# Patient Record
Sex: MALE | Race: WHITE | NOT HISPANIC OR LATINO | URBAN - NONMETROPOLITAN AREA
[De-identification: names, ages, dates, MRNs, and addresses within clinical notes are randomized per-mention and may not be internally consistent; named-entity substitution may affect disease eponyms.]

---

## 2020-12-17 ENCOUNTER — OFFICE VISIT (OUTPATIENT)
Dept: URBAN - NONMETROPOLITAN AREA CLINIC 2 | Facility: CLINIC | Age: 60
End: 2020-12-17
Payer: COMMERCIAL

## 2020-12-17 ENCOUNTER — DASHBOARD ENCOUNTERS (OUTPATIENT)
Age: 60
End: 2020-12-17

## 2020-12-17 DIAGNOSIS — B19.20 HCV (HEPATITIS C VIRUS) S/P SVR: ICD-10-CM

## 2020-12-17 DIAGNOSIS — K63.5 COLON POLYPS: ICD-10-CM

## 2020-12-17 DIAGNOSIS — R10.9 ABDOMINAL PAIN: ICD-10-CM

## 2020-12-17 DIAGNOSIS — K20.90 ESOPHAGITIS: ICD-10-CM

## 2020-12-17 PROCEDURE — G8482 FLU IMMUNIZE ORDER/ADMIN: HCPCS | Performed by: NURSE PRACTITIONER

## 2020-12-17 PROCEDURE — 99214 OFFICE O/P EST MOD 30 MIN: CPT | Performed by: NURSE PRACTITIONER

## 2020-12-17 PROCEDURE — G9903 PT SCRN TBCO ID AS NON USER: HCPCS | Performed by: NURSE PRACTITIONER

## 2020-12-17 PROCEDURE — 3017F COLORECTAL CA SCREEN DOC REV: CPT | Performed by: NURSE PRACTITIONER

## 2020-12-17 PROCEDURE — G8420 CALC BMI NORM PARAMETERS: HCPCS | Performed by: NURSE PRACTITIONER

## 2020-12-17 PROCEDURE — G8427 DOCREV CUR MEDS BY ELIG CLIN: HCPCS | Performed by: NURSE PRACTITIONER

## 2020-12-17 RX ORDER — SUCRALFATE 1 G/1
1 TABLET ON AN EMPTY STOMACH TABLET ORAL THREE TIMES A DAY
Qty: 90 TABLET | Refills: 3 | OUTPATIENT
Start: 2020-12-17 | End: 2021-04-16

## 2020-12-17 RX ORDER — OMEPRAZOLE 40 MG/1
1 CAPSULE 30 MINUTES BEFORE MORNING MEAL CAPSULE, DELAYED RELEASE ORAL ONCE A DAY
Qty: 90 CAPSULE | Refills: 3 | OUTPATIENT
Start: 2020-12-17

## 2020-12-17 RX ORDER — DICYCLOMINE HYDROCHLORIDE 20 MG/1
TAKE 1 TABLET (20 MG) BY ORAL ROUTE 4 TIMES PER DAY FOR 30 DAYS TABLET ORAL
Qty: 120 | Refills: 11 | Status: ACTIVE | COMMUNITY
Start: 2020-01-29 | End: 2021-01-23

## 2020-12-17 RX ORDER — METRONIDAZOLE 500 MG/1
TABLET ORAL
Qty: 0 | Refills: 0 | Status: ACTIVE | COMMUNITY
Start: 1900-01-01

## 2020-12-17 RX ORDER — OXYCODONE 15 MG/1
TAKE 1 TABLET (15 MG) BY ORAL ROUTE EVERY 6 HOURS TABLET ORAL
Qty: 0 | Refills: 0 | Status: ACTIVE | COMMUNITY
Start: 1900-01-01

## 2020-12-17 RX ORDER — DICYCLOMINE HYDROCHLORIDE 10 MG/1
1 CAPSULES CAPSULE ORAL THREE TIMES A DAY
Qty: 90 CAPSULE | Refills: 3 | OUTPATIENT
Start: 2020-12-17 | End: 2021-04-16

## 2020-12-17 NOTE — HPI-TODAY'S VISIT:
Arun presents with abdominal pain.  He was last seen in January of this year with some quadrant and flank pain.  A CT was obtained that was normal.  His last EGD was 3/19 with class D esophagitis and a colonoscopy with 1 TA polyp and sigmoid diverticulosis at that time.  He also carries a history of hep C.  HCV undetectable at his last office visit.   He states he continues to have this pain that is now in his R flank. It was in the L. there is a notable buldge, but I dont this is a hernia. He was previously tx with cipro/flagyl with no improvement and we did a CT earlier in the year for this complaint. He is only on pepcid for GERD, which is not covering his reflux. bowels are regular. no additional complaints. SB

## 2022-08-23 ENCOUNTER — TELEPHONE ENCOUNTER (OUTPATIENT)
Dept: URBAN - NONMETROPOLITAN AREA CLINIC 2 | Facility: CLINIC | Age: 62
End: 2022-08-23

## 2022-12-06 ENCOUNTER — OFFICE VISIT (OUTPATIENT)
Dept: URBAN - NONMETROPOLITAN AREA CLINIC 2 | Facility: CLINIC | Age: 62
End: 2022-12-06

## 2024-06-12 ENCOUNTER — LAB OUTSIDE AN ENCOUNTER (OUTPATIENT)
Dept: URBAN - NONMETROPOLITAN AREA CLINIC 2 | Facility: CLINIC | Age: 64
End: 2024-06-12

## 2024-06-12 ENCOUNTER — OFFICE VISIT (OUTPATIENT)
Dept: URBAN - NONMETROPOLITAN AREA CLINIC 2 | Facility: CLINIC | Age: 64
End: 2024-06-12
Payer: COMMERCIAL

## 2024-06-12 VITALS
WEIGHT: 180 LBS | DIASTOLIC BLOOD PRESSURE: 82 MMHG | TEMPERATURE: 98 F | BODY MASS INDEX: 27.28 KG/M2 | SYSTOLIC BLOOD PRESSURE: 145 MMHG | HEART RATE: 76 BPM | HEIGHT: 68 IN

## 2024-06-12 DIAGNOSIS — R10.9 ABDOMINAL PAIN: ICD-10-CM

## 2024-06-12 DIAGNOSIS — K44.9 HIATAL HERNIA: ICD-10-CM

## 2024-06-12 DIAGNOSIS — K20.90 ESOPHAGITIS: ICD-10-CM

## 2024-06-12 DIAGNOSIS — K63.5 COLON POLYPS: ICD-10-CM

## 2024-06-12 DIAGNOSIS — B19.20 HCV (HEPATITIS C VIRUS) S/P SVR: ICD-10-CM

## 2024-06-12 PROCEDURE — 99204 OFFICE O/P NEW MOD 45 MIN: CPT | Performed by: NURSE PRACTITIONER

## 2024-06-12 RX ORDER — METRONIDAZOLE 500 MG/1
TABLET ORAL
Qty: 0 | Refills: 0 | Status: ACTIVE | COMMUNITY
Start: 1900-01-01

## 2024-06-12 RX ORDER — OMEPRAZOLE 40 MG/1
1 CAPSULE 30 MINUTES BEFORE MORNING MEAL CAPSULE, DELAYED RELEASE ORAL ONCE A DAY
Qty: 90 CAPSULE | Refills: 3 | Status: ACTIVE | COMMUNITY
Start: 2020-12-17

## 2024-06-12 RX ORDER — OMEPRAZOLE 40 MG/1
1 CAPSULE 30 MINUTES BEFORE MORNING MEAL CAPSULE, DELAYED RELEASE ORAL TWICE DAILY
Qty: 180 | Refills: 3 | OUTPATIENT

## 2024-06-12 RX ORDER — SODIUM, POTASSIUM,MAG SULFATES 17.5-3.13G
AS DIRECTED SOLUTION, RECONSTITUTED, ORAL ORAL
Qty: 1 | Refills: 0 | OUTPATIENT
Start: 2024-06-12 | End: 2024-06-14

## 2024-06-12 RX ORDER — OXYCODONE 15 MG/1
TAKE 1 TABLET (15 MG) BY ORAL ROUTE EVERY 6 HOURS TABLET ORAL
Qty: 0 | Refills: 0 | Status: ACTIVE | COMMUNITY
Start: 1900-01-01

## 2024-06-12 NOTE — HPI-TODAY'S VISIT:
Patient is a pleasant 63-year-old male previously seen for hep C treated with Epclusa and confirmed viral load of 0 following treatment who presents today discuss reflux, his concern for hernia, and colonoscopy.  He is due for repeat colonoscopy due to polyp history.  He was last seen in 2020 and was supposed to have a repeat EGD at that time for class D esophagitis.  He is open to scheduling this today.  He is on omeprazole once daily for reflux and reports breakthrough about 3 or so days out of the week.  He continues to have a left-sided bulge with discomfort.  We have obtained ultrasounds, as well as CTs of this area.  He does have some left-sided diverticulosis, but he is describing more of a mid to the left center bulge with pain when it occurs.  He would like to be referred to a hernia surgeon to discuss. Sb

## 2024-07-11 ENCOUNTER — P2P PATIENT RECORD (OUTPATIENT)
Age: 64
End: 2024-07-11

## 2024-07-26 ENCOUNTER — TELEPHONE ENCOUNTER (OUTPATIENT)
Dept: URBAN - NONMETROPOLITAN AREA CLINIC 2 | Facility: CLINIC | Age: 64
End: 2024-07-26

## 2024-07-26 RX ORDER — OMEPRAZOLE 40 MG/1
1 CAPSULE 30 MINUTES BEFORE MORNING MEAL CAPSULE, DELAYED RELEASE ORAL TWICE DAILY
Qty: 180 | Refills: 3

## 2024-07-31 ENCOUNTER — OFFICE VISIT (OUTPATIENT)
Dept: URBAN - NONMETROPOLITAN AREA SURGERY CENTER 1 | Facility: SURGERY CENTER | Age: 64
End: 2024-07-31

## 2024-07-31 RX ORDER — OXYCODONE 15 MG/1
TAKE 1 TABLET (15 MG) BY ORAL ROUTE EVERY 6 HOURS TABLET ORAL
Qty: 0 | Refills: 0 | Status: ACTIVE | COMMUNITY
Start: 1900-01-01

## 2024-07-31 RX ORDER — METRONIDAZOLE 500 MG/1
TABLET ORAL
Qty: 0 | Refills: 0 | Status: ACTIVE | COMMUNITY
Start: 1900-01-01

## 2024-07-31 RX ORDER — OMEPRAZOLE 40 MG/1
1 CAPSULE 30 MINUTES BEFORE MORNING MEAL CAPSULE, DELAYED RELEASE ORAL TWICE DAILY
Qty: 180 | Refills: 3 | Status: ACTIVE | COMMUNITY

## 2024-09-12 ENCOUNTER — OFFICE VISIT (OUTPATIENT)
Dept: URBAN - NONMETROPOLITAN AREA CLINIC 2 | Facility: CLINIC | Age: 64
End: 2024-09-12

## 2024-10-29 ENCOUNTER — OFFICE VISIT (OUTPATIENT)
Dept: URBAN - NONMETROPOLITAN AREA CLINIC 2 | Facility: CLINIC | Age: 64
End: 2024-10-29

## 2025-08-22 ENCOUNTER — WEB ENCOUNTER (OUTPATIENT)
Dept: URBAN - NONMETROPOLITAN AREA CLINIC 2 | Facility: CLINIC | Age: 65
End: 2025-08-22

## 2025-08-25 ENCOUNTER — WEB ENCOUNTER (OUTPATIENT)
Dept: URBAN - NONMETROPOLITAN AREA CLINIC 2 | Facility: CLINIC | Age: 65
End: 2025-08-25